# Patient Record
Sex: MALE | Race: WHITE | NOT HISPANIC OR LATINO | Employment: FULL TIME | URBAN - METROPOLITAN AREA
[De-identification: names, ages, dates, MRNs, and addresses within clinical notes are randomized per-mention and may not be internally consistent; named-entity substitution may affect disease eponyms.]

---

## 2024-04-26 ENCOUNTER — HOSPITAL ENCOUNTER (EMERGENCY)
Facility: HOSPITAL | Age: 59
Discharge: HOME | End: 2024-04-26
Payer: COMMERCIAL

## 2024-04-26 ENCOUNTER — APPOINTMENT (OUTPATIENT)
Dept: RADIOLOGY | Facility: HOSPITAL | Age: 59
End: 2024-04-26
Payer: COMMERCIAL

## 2024-04-26 VITALS
HEIGHT: 71 IN | RESPIRATION RATE: 18 BRPM | SYSTOLIC BLOOD PRESSURE: 157 MMHG | OXYGEN SATURATION: 100 % | HEART RATE: 75 BPM | DIASTOLIC BLOOD PRESSURE: 85 MMHG | BODY MASS INDEX: 30.52 KG/M2 | WEIGHT: 218 LBS | TEMPERATURE: 97.7 F

## 2024-04-26 DIAGNOSIS — R59.1 LYMPHADENOPATHY: Primary | ICD-10-CM

## 2024-04-26 DIAGNOSIS — M54.2 NECK PAIN: ICD-10-CM

## 2024-04-26 DIAGNOSIS — M47.812 OSTEOARTHRITIS OF CERVICAL SPINE, UNSPECIFIED SPINAL OSTEOARTHRITIS COMPLICATION STATUS: ICD-10-CM

## 2024-04-26 DIAGNOSIS — M25.511 RIGHT SHOULDER PAIN, UNSPECIFIED CHRONICITY: ICD-10-CM

## 2024-04-26 PROCEDURE — 99284 EMERGENCY DEPT VISIT MOD MDM: CPT | Mod: 25

## 2024-04-26 PROCEDURE — 73030 X-RAY EXAM OF SHOULDER: CPT | Mod: RIGHT SIDE | Performed by: RADIOLOGY

## 2024-04-26 PROCEDURE — 72125 CT NECK SPINE W/O DYE: CPT | Performed by: RADIOLOGY

## 2024-04-26 PROCEDURE — 73030 X-RAY EXAM OF SHOULDER: CPT | Mod: RT

## 2024-04-26 PROCEDURE — 2500000004 HC RX 250 GENERAL PHARMACY W/ HCPCS (ALT 636 FOR OP/ED): Performed by: NURSE PRACTITIONER

## 2024-04-26 PROCEDURE — 96372 THER/PROPH/DIAG INJ SC/IM: CPT | Performed by: NURSE PRACTITIONER

## 2024-04-26 PROCEDURE — 72125 CT NECK SPINE W/O DYE: CPT

## 2024-04-26 RX ORDER — KETOROLAC TROMETHAMINE 30 MG/ML
30 INJECTION, SOLUTION INTRAMUSCULAR; INTRAVENOUS ONCE
Status: COMPLETED | OUTPATIENT
Start: 2024-04-26 | End: 2024-04-26

## 2024-04-26 RX ORDER — ORPHENADRINE CITRATE 30 MG/ML
60 INJECTION INTRAMUSCULAR; INTRAVENOUS ONCE
Status: COMPLETED | OUTPATIENT
Start: 2024-04-26 | End: 2024-04-26

## 2024-04-26 RX ORDER — NAPROXEN 500 MG/1
500 TABLET ORAL 2 TIMES DAILY PRN
Qty: 14 TABLET | Refills: 0 | Status: SHIPPED | OUTPATIENT
Start: 2024-04-26 | End: 2024-05-03

## 2024-04-26 RX ORDER — METHOCARBAMOL 500 MG/1
500 TABLET, FILM COATED ORAL 3 TIMES DAILY PRN
Qty: 15 TABLET | Refills: 0 | Status: SHIPPED | OUTPATIENT
Start: 2024-04-26 | End: 2024-05-01

## 2024-04-26 RX ADMIN — ORPHENADRINE CITRATE 60 MG: 60 INJECTION INTRAMUSCULAR; INTRAVENOUS at 09:22

## 2024-04-26 RX ADMIN — KETOROLAC TROMETHAMINE 30 MG: 30 INJECTION, SOLUTION INTRAMUSCULAR at 09:22

## 2024-04-26 ASSESSMENT — LIFESTYLE VARIABLES
HAVE PEOPLE ANNOYED YOU BY CRITICIZING YOUR DRINKING: NO
TOTAL SCORE: 0
EVER HAD A DRINK FIRST THING IN THE MORNING TO STEADY YOUR NERVES TO GET RID OF A HANGOVER: NO
EVER FELT BAD OR GUILTY ABOUT YOUR DRINKING: NO
HAVE YOU EVER FELT YOU SHOULD CUT DOWN ON YOUR DRINKING: NO

## 2024-04-26 ASSESSMENT — COLUMBIA-SUICIDE SEVERITY RATING SCALE - C-SSRS
1. IN THE PAST MONTH, HAVE YOU WISHED YOU WERE DEAD OR WISHED YOU COULD GO TO SLEEP AND NOT WAKE UP?: NO
6. HAVE YOU EVER DONE ANYTHING, STARTED TO DO ANYTHING, OR PREPARED TO DO ANYTHING TO END YOUR LIFE?: NO
2. HAVE YOU ACTUALLY HAD ANY THOUGHTS OF KILLING YOURSELF?: NO

## 2024-04-26 ASSESSMENT — PAIN DESCRIPTION - PROGRESSION: CLINICAL_PROGRESSION: GRADUALLY IMPROVING

## 2024-04-26 ASSESSMENT — PAIN - FUNCTIONAL ASSESSMENT: PAIN_FUNCTIONAL_ASSESSMENT: 0-10

## 2024-04-26 ASSESSMENT — PAIN SCALES - GENERAL: PAINLEVEL_OUTOF10: 5 - MODERATE PAIN

## 2024-04-26 NOTE — DISCHARGE INSTRUCTIONS
Your imaging test today showed one or more incidental findings that may require follow-up.  Please take a copy of these papers (which include the imaging report) to your primary care provider to discuss.

## 2024-04-26 NOTE — ED PROVIDER NOTES
HPI   Chief Complaint   Patient presents with    Shoulder Pain     X 2 days. No injuries       Patient is a 59-year-old male who presents ED today due to right shoulder and neck pain.  Denies injury.  History of neck pain, leukemia, sleep apnea.  Patient denies any distal numbness or weakness.  Patient states pain started last evening and worsened this morning.  Yesterday it was pain in the neck and now he is having difficulty lifting his arm due to pain radiating into his shoulder.        History provided by:  Patient   used: No                        No data recorded                   Patient History   Past Medical History:   Diagnosis Date    Personal history of leukemia     History of leukemia    Personal history of malignant neoplasm, unspecified     History of malignant neoplasm    Personal history of other diseases of the nervous system and sense organs     History of sleep apnea    Personal history of other endocrine, nutritional and metabolic disease     History of high cholesterol    Personal history of other specified conditions     History of heartburn     Past Surgical History:   Procedure Laterality Date    APPENDECTOMY  10/23/2017    Appendectomy    HERNIA REPAIR  10/23/2017    Hernia Repair     No family history on file.  Social History     Tobacco Use    Smoking status: Not on file    Smokeless tobacco: Not on file   Substance Use Topics    Alcohol use: Not on file    Drug use: Not on file       Physical Exam   ED Triage Vitals [04/26/24 0840]   Temperature Heart Rate Respirations BP   36.5 °C (97.7 °F) 82 18 178/84      Pulse Ox Temp src Heart Rate Source Patient Position   96 % -- -- --      BP Location FiO2 (%)     -- --       Physical Exam  Vitals and nursing note reviewed.   Constitutional:       General: He is not in acute distress.     Appearance: He is well-developed.   HENT:      Head: Normocephalic and atraumatic.   Eyes:      Conjunctiva/sclera: Conjunctivae normal.    Cardiovascular:      Rate and Rhythm: Normal rate and regular rhythm.      Heart sounds: No murmur heard.  Pulmonary:      Effort: Pulmonary effort is normal. No respiratory distress.      Breath sounds: Normal breath sounds.   Abdominal:      Palpations: Abdomen is soft.      Tenderness: There is no abdominal tenderness.   Musculoskeletal:         General: No swelling.      Right shoulder: Tenderness present. No bony tenderness. Decreased range of motion.      Left shoulder: Normal.      Cervical back: Neck supple. Spasms and tenderness (Throughout right paraspinal plain) present. No pain with movement.      Thoracic back: Normal.      Lumbar back: Normal.      Comments: EXTREMITIES: Right upper extremity: No peripheral edema. Negative Homans bilaterally, no cords. Exam of the shoulder shows . Patient has markedly decreased ROM with pain.  I am able to passively range the shoulder although it is painful.  Normal flexion, extension, abduction, adduction, internal rotation, and external rotation. The skin is intact. Is neurovascularly intact distally. Specifically, has full strength with flexion and extension of the digits. Is nontender over the wrist. Remainder the extremity is nontender.  2+ radial pulse with normal capillary refill   Skin:     General: Skin is warm and dry.      Capillary Refill: Capillary refill takes less than 2 seconds.   Neurological:      Mental Status: He is alert.   Psychiatric:         Mood and Affect: Mood normal.         ED Course & MDM   ED Course as of 04/26/24 1148   Fri Apr 26, 2024   0959 XR shoulder right 2+ views  Marked right acromioclavicular joint osteoarthritis [WS]   1111 CT cervical spine wo IV contrast  No CT evidence of cervical spine fracture in this exam.      DJD in the cervical spine as described.      Evidence of previous left-sided paranasal sinus surgery.  Rhinosinusitis as described. Please see above for details.      Mild nonspecific bilateral cervical  adenopathy, perhaps reactive   [WS]      ED Course User Index  [WS] Logan Weir, APRN-CNP         Diagnoses as of 04/26/24 1148   Right shoulder pain, unspecified chronicity   Neck pain   Osteoarthritis of cervical spine, unspecified spinal osteoarthritis complication status   Lymphadenopathy       Medical Decision Making  Differential diagnosis: Neck strain, torticollis, malignancy, soft tissue injury.  Patient's vital signs are stable.  He was given IM Toradol and Norflex for pain relief.  CT imaging of the C-spine and x-ray imaging of the right shoulder were obtained.    Medical decision making was discussed throughout the ED course, in summary:  Patient's x-ray imaging of the right shoulder does reveal marked right AC joint osteoarthritis.  CT of the C-spine reveals degenerative changes, no acute fracture or subluxation.  Also noted rhinosinusitis and a likely reactive lymph nodes.  Patient is not having any sinus pain or congestion concerning for sinusitis.  Had significant relief with Toradol and Norflex in the ED.  He prescribed anti-inflammatory muscle relaxer for home.  Also prescribe based sling for comfort.  Patient advised not to drink drive or operate machinery when taking muscle relaxers.    I discussed the differential, results and discharge plan with the patient.  I emphasized the importance of follow-up with the physician I referred them to in the timeframe recommended.  I explained reasons for the them to return to the Emergency Department. Additional verbal discharge instructions were also given and discussed with them to supplement those generated by the EMR. We also discussed medications that were prescribed (if any) including common side effects and interactions. All questions were addressed.  They understand return precautions and discharge instructions. They expressed understanding.        Amount and/or Complexity of Data Reviewed  Radiology: ordered and independent interpretation  performed. Decision-making details documented in ED Course.    Risk  OTC drugs.  Prescription drug management.        Procedure  Procedures     ARIANA Reyes-RAMSES  04/26/24 1146       ARIANA Reyes-RAMSES  04/26/24 1148

## 2025-03-02 ENCOUNTER — APPOINTMENT (OUTPATIENT)
Dept: RADIOLOGY | Facility: HOSPITAL | Age: 60
End: 2025-03-02
Payer: COMMERCIAL

## 2025-03-02 ENCOUNTER — HOSPITAL ENCOUNTER (EMERGENCY)
Facility: HOSPITAL | Age: 60
Discharge: HOME | End: 2025-03-02
Attending: STUDENT IN AN ORGANIZED HEALTH CARE EDUCATION/TRAINING PROGRAM
Payer: COMMERCIAL

## 2025-03-02 VITALS
WEIGHT: 235 LBS | HEIGHT: 71 IN | BODY MASS INDEX: 32.9 KG/M2 | OXYGEN SATURATION: 98 % | HEART RATE: 74 BPM | DIASTOLIC BLOOD PRESSURE: 88 MMHG | SYSTOLIC BLOOD PRESSURE: 194 MMHG | TEMPERATURE: 97.7 F | RESPIRATION RATE: 16 BRPM

## 2025-03-02 DIAGNOSIS — M79.89 LEG SWELLING: ICD-10-CM

## 2025-03-02 DIAGNOSIS — S80.12XA HEMATOMA OF LEFT LOWER LEG: Primary | ICD-10-CM

## 2025-03-02 PROCEDURE — 93971 EXTREMITY STUDY: CPT

## 2025-03-02 PROCEDURE — 99284 EMERGENCY DEPT VISIT MOD MDM: CPT | Mod: 25 | Performed by: STUDENT IN AN ORGANIZED HEALTH CARE EDUCATION/TRAINING PROGRAM

## 2025-03-02 PROCEDURE — 93971 EXTREMITY STUDY: CPT | Performed by: RADIOLOGY

## 2025-03-02 PROCEDURE — 2500000001 HC RX 250 WO HCPCS SELF ADMINISTERED DRUGS (ALT 637 FOR MEDICARE OP): Performed by: STUDENT IN AN ORGANIZED HEALTH CARE EDUCATION/TRAINING PROGRAM

## 2025-03-02 RX ORDER — ACETAMINOPHEN 325 MG/1
650 TABLET ORAL ONCE
Status: COMPLETED | OUTPATIENT
Start: 2025-03-02 | End: 2025-03-02

## 2025-03-02 RX ADMIN — ACETAMINOPHEN 650 MG: 325 TABLET, FILM COATED ORAL at 06:01

## 2025-03-02 NOTE — DISCHARGE INSTRUCTIONS
You are diagnosed with a hematoma no evidence of blood clot recommend Tylenol Motrin alternating warm compresses elevate the leg recommend rest ice compression elevation.  Follow-up with your primary provider in the coming days to ensure improvement and resolution of symptoms.  Should you been experiencing pain out of proportion new weakness numbness tingling symptoms concerning to call 911 or return to the nearest emergency department immediately.

## 2025-03-02 NOTE — ED PROVIDER NOTES
EMERGENCY DEPARTMENT ENCOUNTER      Pt Name: William Hawkins  MRN: [id number]  Birthdate 1965  Date of evaluation: 3/2/2025  Provider: Emery Quinn DO    CHIEF COMPLAINT       Chief Complaint   Patient presents with    Ankle Pain    Leg Pain     Has lump on left medial calf since Tuesday.       HISTORY OF PRESENT ILLNESS    William Hawkins is a 60 y.o. male who presents to the emergency department with Himself for left calf swelling and discoloration.  Patient does not recall any injury notes that the symptoms started this past Tuesday.  He initially noticed that the calf was swollen swelling has significantly improved although there is now discoloration and minimal swelling has tracked to lower towards the ankle.  He denies any pain in the leg itself.  No history of blood clots.  No recent long travel.  No further related symptoms at this time.  Patient does report that he has chronic neuropathy secondary to his diabetes in the lower extremities which is unchanged.            Nursing Notes were reviewed.    REVIEW OF SYSTEMS     CONSTITUTIONAL: Denies fever, sweats, chills.   NEURO: Denies difficulty walking, numbness, weakness, tingling, headache.   HEENT: Denies sore throat, rhinorrhea, changes in vision.   CARDIO: Denies chest pain, palpitations.  PULM: Denies shortness of breath, cough.   GI: Denies abdominal pain, nausea, vomiting, diarrhea, constipation, melena, hematochezia.  : Denies painful urination, frequency, hematuria.   MSK: Denies recent trauma.   SKIN: Endorses ecchymosis to leg.  ENDOCRINE: Denies unexpected weight-loss.   HEME: Denies bleeding disorder.     PAST MEDICAL HISTORY     Past Medical History:   Diagnosis Date    Personal history of leukemia     History of leukemia    Personal history of malignant neoplasm, unspecified     History of malignant neoplasm    Personal history of other diseases of the nervous system and sense organs     History of sleep apnea    Personal  history of other endocrine, nutritional and metabolic disease     History of high cholesterol    Personal history of other specified conditions     History of heartburn       SURGICAL HISTORY       Past Surgical History:   Procedure Laterality Date    APPENDECTOMY  10/23/2017    Appendectomy    HERNIA REPAIR  10/23/2017    Hernia Repair       ALLERGIES     Ceclor [cefaclor]    FAMILY HISTORY     No family history on file.     SOCIAL HISTORY       Social History     Socioeconomic History    Marital status: Legally        PHYSICAL EXAM   VS: As documented in the triage note from today's date and EMR flowsheet were reviewed.  Gen: Well developed. No acute distress. Seated in bed. Appears nontoxic.   Skin: Warm. Dry. Intact. No rashes or lesions.  There is some ecchymosis over the left calf no evidence of hematoma.  No skin breakdown or cellulitis.  Eyes: Pupils equally round and reactive to light. Clear sclera.   HENT: Atraumatic appearance. Mucosal membranes moist. No oral lesions, uvula midline, airway patent.   CV: Regular rate and regular rhythm. S1, S2. No pedal edema. Warm extremities.  Resp: Nonlabored breathing Clear to auscultation bilaterally. No increased work of breathing.   GI: Soft and nontender. No rebound or guarding. Bowel sounds x4 present.   MSK: Symmetric muscle bulk. No joint swelling in the extremities. Compartments are soft. Neurovascularly intact x4 extremities. Radial pulses +2 equal bilaterally.  Pedal pulses +2 equal bilaterally.  Neuro: Alert. Speech fluent. Moving all extremities. No focal deficits. Gait normal.  Psych: Appropriate. Kempt.    DIAGNOSTIC RESULTS   RADIOLOGY:   Non-plain film images such as CT, Ultrasound and MRI are read by the radiologist. Plain radiographic images are visualized and preliminarily interpreted by the emergency physician with the below findings:      Interpretation per the Radiologist below, if available at the time of this note:    Lower extremity  "venous duplex left   Final Result   No deep venous thrombosis of the  left lower extremity.        MACRO:   None        Signed by: Karma Bhat 3/2/2025 6:44 AM   Dictation workstation:   [id number]            ED BEDSIDE ULTRASOUND:   Performed by ED Physician - none    LABS:  Labs Reviewed - No data to display    All other labs were within normal range or not returned as of this dictation.    EMERGENCY DEPARTMENT COURSE/MDM:   Vitals:    Vitals:    03/02/25 0521 03/02/25 0525   BP: (!) 194/88    BP Location: Right arm    Pulse: 74    Resp: 16    Temp: 36.5 °C (97.7 °F)    SpO2: 98% 98%   Weight: 107 kg (235 lb)    Height: 1.803 m (5' 11\")        I reviewed the patient's triage vitals and they are Hypertensive recommended follow-up with primary physician for repeat checks.    Due to the above findings the following was ordered duplex of the lower extremity.  Tylenol for discomfort.    Duplex is negative for DVT.  Clinically I do have suspicion that this was likely hematoma from ruptured venous vessel.  Already has reabsorbed.  Patient has minimal swelling from reabsorption.  Ecchymosis in various stages of healing present.  Low concern for any arterial pathology.  Patient is ambulatory recommend close follow-up with primary physician rest ice compression elevation Tylenol as needed.  He is appreciative of care discharged in stable condition.    Diagnoses as of 03/02/25 1628   Hematoma of left lower leg   Leg swelling       Patient was counseled regarding labs, imaging, likely diagnosis, and plan. All questions were answered.     ------------------------------------------------------------------  Information provided by the patient  Previous medical records reviewed office visit 12/21/2021  Considered CTA of the lower extremity although no indication  Shared medical decision making patient agreeable with close outpatient follow-up.  ------------------------------------------------------------------  ED " Medications administered this visit:    Medications   acetaminophen (Tylenol) tablet 650 mg (650 mg oral Given 3/2/25 0601)       New Prescriptions from this visit:    Discharge Medication List as of 3/2/2025  6:48 AM          Follow-up:  Byron Perea DO  [address] [facility] [address]  [phone]    Schedule an appointment as soon as possible for a visit in 2 days      [facility]  [address] [facility] [address] [phone]  Go to   If symptoms worsen        Final Impression:   1. Hematoma of left lower leg    2. Leg swelling          Emery Quinn DO    (Please note that portions of this note were completed with a voice recognition program.  Efforts were made to edit the dictations but occasionally words are mis-transcribed.)     Emery Quinn DO  03/02/25 0501

## 2025-05-02 ENCOUNTER — APPOINTMENT (OUTPATIENT)
Dept: RADIOLOGY | Facility: HOSPITAL | Age: 60
End: 2025-05-02
Payer: COMMERCIAL

## 2025-05-02 ENCOUNTER — HOSPITAL ENCOUNTER (EMERGENCY)
Facility: HOSPITAL | Age: 60
Discharge: HOME | End: 2025-05-02
Attending: EMERGENCY MEDICINE
Payer: COMMERCIAL

## 2025-05-02 VITALS
DIASTOLIC BLOOD PRESSURE: 69 MMHG | OXYGEN SATURATION: 98 % | WEIGHT: 225 LBS | TEMPERATURE: 96.8 F | SYSTOLIC BLOOD PRESSURE: 193 MMHG | HEIGHT: 71 IN | BODY MASS INDEX: 31.5 KG/M2 | HEART RATE: 79 BPM | RESPIRATION RATE: 15 BRPM

## 2025-05-02 DIAGNOSIS — J40 BRONCHITIS: Primary | ICD-10-CM

## 2025-05-02 LAB
FLUAV RNA RESP QL NAA+PROBE: NOT DETECTED
FLUBV RNA RESP QL NAA+PROBE: NOT DETECTED
RSV RNA RESP QL NAA+PROBE: NOT DETECTED
SARS-COV-2 RNA RESP QL NAA+PROBE: NOT DETECTED

## 2025-05-02 PROCEDURE — 2500000001 HC RX 250 WO HCPCS SELF ADMINISTERED DRUGS (ALT 637 FOR MEDICARE OP): Performed by: EMERGENCY MEDICINE

## 2025-05-02 PROCEDURE — 99284 EMERGENCY DEPT VISIT MOD MDM: CPT | Performed by: EMERGENCY MEDICINE

## 2025-05-02 PROCEDURE — 87637 SARSCOV2&INF A&B&RSV AMP PRB: CPT | Performed by: EMERGENCY MEDICINE

## 2025-05-02 PROCEDURE — 71045 X-RAY EXAM CHEST 1 VIEW: CPT | Performed by: STUDENT IN AN ORGANIZED HEALTH CARE EDUCATION/TRAINING PROGRAM

## 2025-05-02 PROCEDURE — 71045 X-RAY EXAM CHEST 1 VIEW: CPT

## 2025-05-02 RX ORDER — IBUPROFEN 800 MG/1
800 TABLET ORAL ONCE
Status: COMPLETED | OUTPATIENT
Start: 2025-05-02 | End: 2025-05-02

## 2025-05-02 RX ORDER — ACETAMINOPHEN 325 MG/1
975 TABLET ORAL ONCE
Status: COMPLETED | OUTPATIENT
Start: 2025-05-02 | End: 2025-05-02

## 2025-05-02 RX ORDER — BENZONATATE 100 MG/1
100 CAPSULE ORAL EVERY 8 HOURS
Qty: 21 CAPSULE | Refills: 0 | Status: SHIPPED | OUTPATIENT
Start: 2025-05-02 | End: 2025-05-09

## 2025-05-02 RX ORDER — METHYLPREDNISOLONE 4 MG/1
TABLET ORAL
Qty: 21 TABLET | Refills: 0 | Status: SHIPPED | OUTPATIENT
Start: 2025-05-02 | End: 2025-05-08

## 2025-05-02 RX ADMIN — IBUPROFEN 800 MG: 800 TABLET, FILM COATED ORAL at 06:18

## 2025-05-02 RX ADMIN — ACETAMINOPHEN 975 MG: 325 TABLET ORAL at 06:18

## 2025-05-02 ASSESSMENT — COLUMBIA-SUICIDE SEVERITY RATING SCALE - C-SSRS
6. HAVE YOU EVER DONE ANYTHING, STARTED TO DO ANYTHING, OR PREPARED TO DO ANYTHING TO END YOUR LIFE?: NO
1. IN THE PAST MONTH, HAVE YOU WISHED YOU WERE DEAD OR WISHED YOU COULD GO TO SLEEP AND NOT WAKE UP?: NO
2. HAVE YOU ACTUALLY HAD ANY THOUGHTS OF KILLING YOURSELF?: NO

## 2025-05-02 NOTE — DISCHARGE INSTRUCTIONS
Please follow-up with the primary care provider in 2 to 3 days.  Take Tessalon Perles for cough, Medrol Dosepak for the chest tightness, use Tylenol/Motrin for pain and fever, increase oral fluid intake.  Return to the emergency department if develop any worsening cough, persistently elevated fever, chest pain, or if concerns arise

## 2025-05-03 NOTE — ED PROVIDER NOTES
HPI   Chief Complaint   Patient presents with    URI       This is a 60 years old otherwise healthy male patient presented to the emergency department with a chief complaint of cough, body aches, chills, mild headache, stated that the symptoms started on Sunday but started to get worse tonight.  Denies any chest pain, shortness of breath, lightheadedness, dizziness, weakness, focal numbness, abdominal pain, diarrhea, constipation or urinary symptoms.    Review of system: As stated above in HPI section              Patient History   Medical History[1]  Surgical History[2]  Family History[3]  Social History[4]    Physical Exam   ED Triage Vitals   Temperature Heart Rate Respirations BP   05/02/25 0133 05/02/25 0133 05/02/25 0133 05/02/25 0133   36 °C (96.8 °F) 79 15 (!) 202/108      Pulse Ox Temp src Heart Rate Source Patient Position   05/02/25 0133 -- -- 05/02/25 0608   98 %   Sitting      BP Location FiO2 (%)     05/02/25 0608 --     Left arm        Physical Exam  Constitutional:       Appearance: Normal appearance.   HENT:      Head: Normocephalic and atraumatic.      Mouth/Throat:      Mouth: Mucous membranes are moist.   Eyes:      Pupils: Pupils are equal, round, and reactive to light.   Cardiovascular:      Rate and Rhythm: Normal rate and regular rhythm.      Heart sounds: No murmur heard.     No friction rub. No gallop.   Pulmonary:      Effort: Pulmonary effort is normal. No respiratory distress.      Breath sounds: No stridor. No wheezing, rhonchi or rales.   Chest:      Chest wall: No tenderness.   Abdominal:      General: There is no distension.      Tenderness: There is no abdominal tenderness. There is no guarding or rebound.   Musculoskeletal:      Cervical back: Normal range of motion and neck supple.      Right lower leg: No edema.      Left lower leg: No edema.   Skin:     General: Skin is warm and dry.      Capillary Refill: Capillary refill takes less than 2 seconds.   Neurological:      General:  No focal deficit present.      Mental Status: He is alert and oriented to person, place, and time. Mental status is at baseline.   Psychiatric:         Mood and Affect: Mood normal.           ED Course & MDM   Diagnoses as of 05/02/25 2116   Bronchitis                 No data recorded     D[place] Coma Scale Score: 15 (05/02/25 0134 : Shweta Singh RN)                           Medical Decision Making  Patient seen and examined, viral panel is unremarkable, presentation is concerning for URI.  Stated that his symptoms this is very similar to when he had bronchitis.  Discharged on Medrol Dosepak, to take Motrin/Tylenol and to take Tessalon Perles for symptomatic treatment of the cough and to increase oral fluid intake and to return to the emergency department if alarming symptoms arise as per discharge instruction        Procedure  Procedures       [1]   Past Medical History:  Diagnosis Date    Personal history of leukemia     History of leukemia    Personal history of malignant neoplasm, unspecified     History of malignant neoplasm    Personal history of other diseases of the nervous system and sense organs     History of sleep apnea    Personal history of other endocrine, nutritional and metabolic disease     History of high cholesterol    Personal history of other specified conditions     History of heartburn   [2]   Past Surgical History:  Procedure Laterality Date    APPENDECTOMY  10/23/2017    Appendectomy    HERNIA REPAIR  10/23/2017    Hernia Repair   [3] No family history on file.  [4]   Social History  Tobacco Use    Smoking status: Not on file    Smokeless tobacco: Not on file   Substance Use Topics    Alcohol use: Not on file    Drug use: Not on file        Ruben Medina DO  05/02/25 2118